# Patient Record
(demographics unavailable — no encounter records)

---

## 2025-03-05 NOTE — DVHPNRES
Progress Note


Date Seen:  Mar 5, 2025


Resident Creating Document:  LISBET COCHRAN RESIDENT


Medical Necessity Reason


Pt with a Central, PICC or Fol:  No





Subjective


Review of Systems


Patient is a 28-year-old female with past medical history of left lower 

extremity DVT diagnosed in , who came in due to pain in her left lower 

extremity.  According to the patient, for the past 2 weeks she has been 

experiencing left leg pain localized to the medial aspect of her leg which has 

been progressively worsening.  Patient notes the day before yesterday she 

noticed swelling in her ankles along with tenderness to palpation which is what 

prompted this visit to the hospital.  Patient notes she had similar pain in 

2022 when she was diagnosed with DVT, patient was prescribed 

anticoagulation for 3 months thereafter which he reports having taken only for 1

month.  Patient notes at rest she just experiences tingling and numbness, pain 

worsens on walking she rates it as 9/10.  Lower extremity Doppler showed 

occlusive thrombus in great saphenous vein.  Patient notes her last menstrual 

period was in 2025, on further questioning, patient revealed that she is 

aware she was pregnant [facility] 

under[facility]&C.








Past surgical history:  


Home medications:  Denies


Past Hospitalization:  Denies


Social & Personal history:  Patient lives alone.  Denies smoking.  Drinks 1 

alcoholic drink daily, last drink was yesterday.  Denies using drugs.





Allergies:  Denies


 








Patient seen and examined at bedside. Patient is alert and oriented to time, 

place person and responding to all questions.


General: Fatigue, chills


Eyes:  No Pain, No Vision change, No Conjunctivae inflammation, No Eyelid 

inflammation, No Other, No Redness


ENT:  No Ear pain, No Ear discharge, No Nose pain, No Nose discharge, No Nose 

congestion, No Mouth pain, No Mouth swelling, No Throat pain, No Throat 

swelling, No Other


Cardiovascular:  No Chest Pain, No Palpitations, No Orthopnea, No Paroxysmal No 

Dyspnea, No Edema, No Lt Headedness, No Other


Respiratory:  No Cough, No Dry,  Shortness of breath, No SOB with exertion, No 

Wheezing, No Hemoptysis, No Pleuritic Pain, No Sputum, No Other


Gastrointestinal:  No Nausea, No Vomiting, No Abdominal Pain, No Diarrhea, No 

Constipation, No Melena, No Hematochezia, No Other


Genitourinary:  No Dysuria, No Frequency, No Incontinence, No Hematuria, No 

Retention, No Other


Musculoskeletal:  No other, No neck pain, No shoulder pain, No arm pain, No back

pain, No hand pain, No leg pain, No foot pain


Skin:  No Rash, No Lesions, No Jaundice, No Bruising, No Other





Objective


vital signs





                                   Vital Sign








  Date Time  Temp Pulse Resp B/P (MAP) Pulse Ox O2 Delivery O2 Flow Rate FiO2


 


3/5/25 07:35 98.7 78 17 123/81 (95) 97   





 98.7       


 


3/5/25 07:35      Room Air  


 


3/5/25 05:22       0 21








medications





                               Current Medications








 Medications  Dose


 Ordered  Sig/Angie


 Route  Start Time


 Stop Time Status Last Admin


Dose Admin


 


 Sodium Chloride  10 ml  Q8HR


 IV  3/5/25 06:00


    3/5/25 14:00


10 ML


 


 Acetaminophen  650 mg  Q6HP  PRN


 PO  3/5/25 04:30


     





 


 Morphine Sulfate  2 mg  Q4HPRN  PRN


 IV  3/5/25 04:30


     





 


 Enoxaparin Sodium  90 mg  Q12HR


 SC  3/5/25 10:00


    3/5/25 14:08


90 MG








Examination


General Appearance:  Cooperative. Well developed. Well nourished.  NAD


Head Exam:  Normal inspection


Neck Exam:  Normal inspection. Non-tender. Normal alignment


Pulmonary/Respiratory:  Chest non-tender. Clear bilateral breath sounds, no 

crackles, no wheezing.


Cardiovascular/Chest: Regular rate and rhythm.  No murmurs.  No JVD.


Peripheral Pulses:  2+ Radial (R). 2+ Radial (L). 2+ Pedal (R). 2+ Pedal (L)


Abdominal Exam:  Normal bowel sounds. Soft.  normal abdomen, no visible veins, 

Nontender.  No hepatospenomegaly.  No masses


Lower extremities: Negative lower extremity edema.  Tenderness to palpation 

around the left ankle and medial aspect of the left lower extremity, no cord or 

masses palpated


Neuro/Mental Status: A&O x4.  Coherent.


Thoughts/Psych:  Normal thought pattern.  Appropriate mood and affect.  Good 

judgement and insight


Skin Exam:  Normal inspection. Normal color. Warm. Dry


laboratory and microbiology


                                Laboratory Tests


3/5/25 02:27

















Test


 3/5/25


02:27 Range/Units


 


 


Serum Glucose 96    mg/dL








Labs and/or images reviewed:  Labs reviewed by me, Image(s) reviewed by me





Problem List/Assessment/Plan


Problem List/Assessment/Plan


Left lower extremity superficial DVT, 2nd episode


Ruled out pulmonary embolism


- lower extremity Doppler:  Occlusive thrombus in great saphenous vein 


- V/Q scan: Low probability for PE


- stopped therapeutic Lovenox and started on prophylactic Lovenox


- ibuprofen 400 mg q.6 hours as needed for left lower extremity tenderness








Obesity class 2 


- counseled





S/p D&C on 2025


- urine pregnancy test positive 


- beta hCG 2817.4


- monitor





Goals of care:  Full code, discussed for >16 minutes on 2025


Plan discussed with patient


Plan discussed with Dr. Wheeler


Plan discussed with:  Patient, Other (RN)





My Orders


My Orders





                           Orders - LISBET COCHRAN RESIDENT








Procedure Category Date Status





  Time 


 


Nm Vq Scan NM 3/5/25 Resulted





  11:59 

















LISBET COCHRAN              Mar 5, 2025 16:53

## 2025-03-05 NOTE — DVH
NUCLEAR MEDICINE VENTILATION/PERFUSION LUNG SCAN.



INDICATION: PULMONARY EMBOLISM



COMPARISON: None



TECHNIQUE:  Following intravenous demonstration of  4 millicuries of technetium 99m MAA, and inhalati
on of 7 mCi of z 9 133 gas scintigrams were obtained in multiple projections of the lungs.



FINDINGS:



There is normal uptake of radionuclide on both the ventilation and perfusion portions of the examinat
ion. No mismatched perfusion defects are demonstrated. Uptake is normally homogeneous.



IMPRESSION: 



1. Low probability for PE.



 



Electronically Signed by: Antonio Bowman at 03/05/2025 15:37:22 PM

## 2025-03-05 NOTE — DVH
Left lower extremity venous duplex



Clinical History: LLE SWELLING



Comparison: LT LOWER DVT on DOS: 10/1/22, LLDVT on DOS: 10/1/22, LLDVT on DOS: 3/31/22



Technique: 



Duplex Doppler evaluation of the deep venous system of the left lower extremity from the common femor
al vein to the popliteal vein including color Doppler and spectral/pulsed waveform analysis was perfo
rmed.



Findings: 



The common femoral vein demonstrates appropriate compressibility and waveform variability.



Occlusive thrombus noted in the great saphenous vein.



The femoral vein demonstrates appropriate compressibility and waveform variability.



The deep femoral vein demonstrates appropriate compressibility and waveform variability.



The popliteal vein demonstrates appropriate compressibility and waveform variability.



There is normal compressibility at the tibioperoneal trunk.



Impression: 



Occlusive thrombus noted in  great saphenous vein.



Electronically Signed by: Ashok Mohan at 03/05/2025 00:22:35 AM

## 2025-03-05 NOTE — DVHHPRES
History of Present Illness


Resident Creating Document:  RONNA LANDRY RESIDENT


History of Present Illness


JET HERRERA is a 28-year-old female with history of DVT in  

presented to the ED with the chief complaints of worsening of left lower 

extremity pain.  Patient reported the pain in the left lower limb started 2 

weeks ago and then since last 2 days patient has been observing swelling and 

redness in the LLL which brought her to visit ED. on my assessment patient 

denies fever, chest pain, shortness of breath, recent long travel, recent 

surgery, cancer, recent immobilization, other acute associated symptoms.  

Patient reported she does have DVT in  after  and she reported she 

under treated for that and she is not taking any blood thinners at this point.





PMH: History of DVT in  


PSH:  


Family history:  No history of cancer, DVTs, blood disorders


Personal history:  Lives with family.  Denies smoking, alcohol and other drug ab

use 


Allergies:  No known allergies 


Home medications:  None





Review of Systems


Review of Systems


Patient seen and examined at the bedside.  Patient reported pain, swelling, 

redness in left lower extremity


Constitutional:  No: Fever, Chills, Sweats, Weakness, Malaise, Other


Eyes:  No: Pain, Vision change, Conjunctivae inflammation, Eyelid inflammation, 

Other, Redness


ENT:  No: Ear pain, Ear discharge, Nose pain, Nose discharge, Nose congestion, 

Mouth pain, Mouth swelling, Throat pain, Throat swelling, Other


Respiratory:  No: Cough, Dry, Shortness of breath, SOB with excertion, Wheezing,

Hemoptysis, Pleuritic Pain, Sputum, Wheezing, Other


Cardiovascular:  No: Chest Pain, Palpitations, Orthopnea, Paroxysmal Noc. 

Dyspnea, Edema, Lt Headedness, Other


Gastrointestinal:  No: Nausea, Vomiting, Abdominal Pain, Diarrhea, Constipation,

Melena, Hematochezia, Other


Genitourinary:  No Dysuria, No Frequency, No Incontinence, No Hematuria, No 

Retention, No Other


Musculoskeletal:  leg pain


Skin:  No: Rash, Lesions, Jaundice, Bruising, Other


Neurological:  No: Weakness, Numbness, Incoordination, Change in speech, 

Confusion, Seizures, Other


Allergies:  


Coded Allergies:  


     NO KNOWN ALLERGIES (Unverified , 11/10/17)


Medications





                               Current Medications








 Medications  Dose


 Ordered  Sig/Angie


 Route  Start Time


 Stop Time Status Last Admin


Dose Admin


 


 Sodium Chloride  10 ml  Q8HR


 IV  3/5/25 06:00


    3/5/25 05:19


10 ML


 


 Ondansetron HCl  4 mg  Q4HP  PRN


 IV  3/5/25 04:30


     





 


 Acetaminophen  650 mg  Q6HP  PRN


 PO  3/5/25 04:30


     





 


 Morphine Sulfate  2 mg  Q4HPRN  PRN


 IV  3/5/25 04:30


     





 


 Nitroglycerin  0.4 mg  Q5MINP  PRN


 SL  3/5/25 04:30


     





 


 Morphine Sulfate  2 mg  Q30M  PRN


 IV  3/5/25 04:30


     





 


 Enoxaparin Sodium  90 mg  Q12HR


 SC  3/5/25 10:00


     














Exam


Vital Signs





Vital Signs








  Date Time  Temp Pulse Resp B/P (MAP) Pulse Ox O2 Delivery O2 Flow Rate FiO2


 


3/5/25 05:22  61 18  99 Room Air* 0 21


 


3/5/25 05:22 98.3   130/78 (95)    





 98.3       








Exam


Pt is lying on bed





General Appearance:  Alert, Oriented X3, Cooperative, Not in acute distress


HEENT:  Atraumatic, Mucous membranes moist/pink


Respiratory:  Clear to auscultation, Normal air movement, No added sounds


Cardiovascular:  Regular rate, Normal S1, Normal S2, No murmurs


Abdominal:   Active bowel sounds, Soft, no distention, no tenderness


Extremities:  Swelling, redness in LLE along with tenderness


Skin:  No   Significant rash, except past surgical scars


Neuro:  Normal speech,  sensorimotor deficits none


Psych/Mental Status:  Mental status NL, Mood NL


Nurse was there as sharperone during examination





Labs/Xrays





                                      Labs








Test


 3/5/25


05:12 3/5/25


05:00 3/5/25


02:27 Range/Units


 


 


White Blood Count


 


 


 5.4 


 4.4-10.8


10^3/uL


 


Red Blood Count


 


 


 4.07 


 4.0-5.20


10^6/uL


 


Hemoglobin   11.5 L 12.2-16.2  g/dL


 


Hematocrit   34.8 L 36.0-46.0  %


 


Mean Corpuscular Volume   85.4  80.0-100.0  fL


 


Mean Corpuscular Hemoglobin   28.2  28.0-32.0  pg


 


Mean Corpuscular Hemoglobin


Concent 


 


 33.0 


 32.0-36.0  g/dL





 


Red Cell Distribution Width   14.0  11.8-14.3  %


 


Platelet Count


 


 


 235 


 140-450


10^3/uL


 


Mean Platelet Volume   8.7  6.9-10.8  fL


 


Neutrophils (%) (Auto)   61.3  37.0-80.0  %


 


Lymphocytes (%) (Auto)   25.0  10.0-50.0  %


 


Monocytes (%) (Auto)   9.3  0.0-12.0  %


 


Eosinophils (%) (Auto)   3.8  0.0-7.0  %


 


Basophils (%) (Auto)   0.6  0.0-2.0  %


 


Neutrophils # (Auto)


 


 


 3.3 


 1.6-8.6  10


^3/uL


 


Lymphocytes # (Auto)


 


 


 1.4 


 0.4-5.4  10


^3/uL


 


Monocytes # (Auto)   0.5  0-1.3  10 ^3/uL


 


Eosinophils # (Auto)   0.2  0-0.8  10 ^3/uL


 


Basophils # (Auto)   0  0-0.2  10 ^3/uL


 


Nucleated Red Blood Cells   0.1   %


 


Sodium Level   142  136-145  mmol/L


 


Potassium Level   3.9  3.5-5.1  mmol/L


 


Chloride Level   107    mmol/L


 


Carbon Dioxide Level   26  20-31  mmol/L


 


Anion Gap   9  5-15  


 


Blood Urea Nitrogen   9  9-23  mg/dL


 


Creatinine


 


 


 0.67 


 0.550-1.02


mg/dL


 


Glomerular Filtration Rate


Calc 


 


 122 


 >90  mL/min





 


BUN/Creatinine Ratio   13.4  10.0-20.0  


 


Serum Glucose   96    mg/dL


 


Hemoglobin A1c   4.8  <5.7  % A1C


 


Calcium Level   9.8  8.7-10.4  mg/dL


 


Total Bilirubin   0.3  0.2-1.0  mg/dL


 


Aspartate Amino Transferase


(AST) 


 


 14 


 13-40  U/L





 


Alanine Aminotransferase (ALT)   15  7-40  U/L


 


Alkaline Phosphatase   56    U/L


 


Total Protein   7.3  5.7-8.2  g/dL


 


Albumin   4.2  3.2-4.8  g/dL


 


Thyroid Stimulating Hormone


(TSH) 


 


 0.71 


 0.55-4.78


uIU/mL











Assessment/Plan


Assessment/Plan


# Left leg DVT in great saphenous vein


-evident on venous scan


-currently giving enoxaparin 1 mg/kg


-ordered anticoagulant panel





# obesity grade 2 with a BMI 35.1


-nutritional counseling





No GI be PPX 


Therapeutic enoxaparin


Cardiac diet





Goals of care discussed with the patient for more than 29 minutes:  Full code 

status 


Case discussed with Dr. Tyson, patient and nurse


Plan discussed with:  Patient


My Orders





                          Orders - RONNA LANDRY RESIDENT








Procedure Category Date Status





  Time 


 


Admit ADMIT 3/5/25 Transmitted





  04:19 


 


Allergies GILDA 3/5/25 In Process





  04:19 


 


Code Status CODE 3/5/25 Transmitted





  04:19 


 


Sodium Chloride Lock PHA 3/5/25 In Process





 (Saline Lock Ns)  06:00 


 


Ondansetron Hcl PHA 3/5/25 In Process





 (Zofran)  04:30 


 


Cardiac DIET 3/5/25 Transmitted





Diet-2gna,Lofat,Lochol  Breakfast 


 


Condition: Stable GILDA 3/5/25 In Process





  04:19 


 


Acetaminophen Tablet PHA 3/5/25 In Process





 (Tylenol Tablet)  04:30 


 


Morphine Sulfate PHA 3/5/25 In Process





Injection  04:30 


 


Nitroglycerin PHA 3/5/25 In Process





Sublingual (Ntrostat  04:30 


 


Morphine Sulfate PHA 3/5/25 In Process





Injection  04:30 


 


Oxygen By Nasal RT 3/5/25 Transmitted





Cannula  04:19 


 


Stat Ekg For Chest GILDA 3/5/25 In Process





Pain  04:19 


 


Notify Md Of Changes GILDA 3/5/25 In Process





From Base  04:19 


 


Enoxaparin Sodium PHA 3/5/25 In Process





 (Lovenox)  10:00 


 


B-Type Natriuretic LAB 3/5/25 In Process





Peptide  04:57 


 


Blood Alcohol LAB 3/5/25 In Process





  04:57 


 


Covid19 Antigen Zohra LAB 3/5/25 Logged





   


 


Drug Screen LAB 3/5/25 Logged





  04:57 


 


Lactic Acid W/ Reflex LAB 3/5/25 Logged





Order  04:57 


 


Magnesium LAB 3/5/25 In Process





  04:57 


 


PTPTT LAB 3/5/25 In Process





  04:57 


 


Rapid Influenza A&B LAB 3/5/25 Logged





  04:57 


 


Urinalysis LAB 3/5/25 Logged





  04:57 


 


Antithrombin Iii LAB 3/5/25 In Process





Antigen  04:57 


 


Martina; Direct LAB 3/5/25 In Process





  04:57 











Date of Service:  Mar 4, 2025


Billing Provider:  FAHAD TYSON MD


Common Visit Codes:  99223-INITIAL  INP/OBS CARE (HIGH)











GRIS,RONNA WOOTEN             Mar 5, 2025 05:41


FAHAD TYSON MD             Mar 5, 2025 18:36

## 2025-03-05 NOTE — ED.PDOC
Musculoskeletal


HPI Comments


20-YEAR-OLD FEMALE COMPLAINING OF LEFT LOWER EXTREMITY PAIN.  PATIENT STATES IT 

STARTED TWO WEEKS AGO AND THEN SHE WAS STARTED NOTICING SWELLING AND REDNESS ON 

THE FRONT OF HER LOWER LEG TODAY.  PATIENT DOES REPORT A HISTORY OF DVT THREE 

YEARS AGO.  AFTER GIVING BIRTH.  STATES SHE WAS NOT CURRENTLY ON ANY BLOOD 

THINNERS.


Chief Complaint:  Lower Extremity


Time Seen by MD:  23:45


Primary Care Provider:  none


Reviewed Notes:  Nurses Notes


Allergies:  


Coded Allergies:  


     NO KNOWN ALLERGIES (Unverified , 11/10/17)


Home Meds


Active Scripts


Apixaban Base (ELIQUIS) 2.5 Mg Tab, 2.5 MG PO BID for 5 Days, #10 TAB


   Prov:CHINMAY GOMES MD         10/2/22


Reported Medications


Prenatal Vit W/ Ferrous Fumara (PRENATAL) 1 Tab Tab, 1 TAB OR, TAB


   10/18/17


Information Source:  Patient


Mode of Arrival:  Ambulatory


Location:  Left





Past Medical History


PAST MEDICAL HISTORY:  Anxiety


Surgical History:  


GYN History:  No Pertinent GYN History





Family History


Family History:  Family hx of DM, Family hx of HTN





Social History


Smoker:  Non-Smoker


Alcohol:  Occasionally


Drugs:  Marijuana


Lives In:  Home





Constitutional:  denies: chills, diaphoresis, fatigue, fever, malaise, sweats, 

weakness, others


EENTM:  denies: blurred vision, double vision, ear bleeding, ear discharge, ear 

drainage, ear pain, ear ringing, eye pain, eye redness, hearing loss, mouth 

pain, mouth swelling, nasal discharge, nose bleeding, nose congestion, nose 

pain, photophobia, tearing, throat pain, throat swelling, voice changes, others


Respiratory:  denies: cough, hemoptysis, orthopnea, SOB at rest, shortness of 

breath, SOB with excertion, stridor, wheezing, others


Cardiovascular:  denies: chest pain, dizzy spells, diaphoresis, Dyspnea on 

exertion, edema, irregular heart beat, left arm pain, lightheadedness, 

palpitations, PND, syncope, others


Gastrointestinal:  denies: abdomen distended, abdominal pain, blood streaked 

bowels, constipated, diarrhea, dysphagia, difficulty swallowing, hematemesis, 

melena, nausea, poor appetite, poor fluid intake, rectal bleeding, rectal pain, 

vomiting, others


Genitourinary:  denies: abnormal vagina bleeding, burning, dyspareunia, dysuria,

flank pain, frequency, hematuria, incontinence, pain, pregnant, vagina 

discharge, urgency, others


Neurological:  denies: dizziness, fainting, headache, left sided numbness, left 

sided weakness, numbness, paresthesia, pre-existing deficit, right sided 

numbness, right sided weakness, seizure, speech problems, tingling, tremors, 

weakness, others


Musculoskeletal:  reports: joint swelling, muscle pain


Integumetry:  denies: bruises, change in color, change in hair/nails, dryness, 

laceration, lesions, lumps, rash, wounds, others


Allergic/Immunocompromised:  denies: Difficulty Healing, Frequent Infections, 

Hives, Itching, others





Physical Exam


General Appearance:  No Apparent Distress, Normal


HEENT:  Normal ENT Inspection, Pharynx Normal, TMs Normal


Neck:  Full Range of Motion, Non-Tender, Normal, Normal Inspection


Respiratory:  Chest Non-Tender, Lungs Clear, No Accessory Muscle Use, No 

Respiratory Distress, Normal Breath Sounds


Cardiovascular:  No Edema, No JVD, No Murmur, No Gallop, Normal Peripheral 

Pulses, Regular Rate/Rhythm


Breast Exam:  Deferred


Gastrointestinal:  No Organomegaly, Non Tender, No Pulsatile Mass, Normal Bowel 

Sounds, Soft


Genitalia:  Deferred


Pelvic:  Deferred


Rectal:  Deferred


Extremities:  No calf tenderness, Normal capillary refill, Normal inspection, 

Normal range of motion, Non-tender, No pedal edema


Musculoskeletal :  


   Location:  Left


   Extremity Location:  Leg (TENDER TO PALPATION OVER THE ANTERIOR LEG)


   Apperance:  Normal


Neurologic:  Alert, CNs II-XII nml as Tested, No Motor Deficits, Normal Affect, 

Normal Mood, No Sensory Deficits


Cerebellar Function:  Normal


Reflexes:  Normal


Skin:  Dry, Normal Color, Warm


Lymphatic:  No Adenopathy





Was a procedure done?


Was a procedure done?:  No





Differential Diagnosis EXT


Differential Diagnosis:  Cellulitis, Deep Vein Thrombosis, Compartment Syndrome,

Sprain, Dislocation





X-Ray, Labs, Meds, VS





                                   Vital Signs








  Date Time  Temp Pulse Resp B/P (MAP) Pulse Ox O2 Delivery O2 Flow Rate FiO2


 


3/4/25 23:50 97.9 67 17 143/84 (103) 99   








X-Ray, Labs, Meds, VS Comment


ULTRASOUND OF LEFT LOWER EXTREMITY: 


Impression: 





Occlusive thrombus noted in  great saphenous vein.





Electronically Signed by: Ashok Mohan at 2025 00:22:35 AM





HE WAS DISCUSSED WITH THE PATIENT THE POSSIBILITY OF ADMISSION IN ORDER FOR 

POSSIBLE IR CONSULT.  PATIENT STATES SHE WOULD PREFER TO TRY CALLING PRIMARY 

CARE DOCTOR AND SITTING A FOLLOW UP APPOINTMENT 


ADVISED PATIENT SHE WILL NEED TO STARTED TAKING LOW-DOSE ASPIRIN DAILY ALONG 

WITH APPLYING COMPRESSION STOCKINGS AND HEAT BAD.


Time of 1ST Reevaluation:  01:35


Reevaluation 1ST:  Unchanged


Patient Education/Counseling:  Diagnosis, Need For Follow Up (PATIENT ADVISED TO

FOLLOW-UP IN THE EMERGENCY ROOM IN THE NEXT 24 TO 48 HOURS IF SYMPTOMS DO NOT 

IMPROVE.  ADVISED FOLLOW-UP WITH PCP IN THE NEXT 3 TO 5 DAYS.  PATIENT 

VERBALIZED UNDERSTANDING. )


Family Education/Counseling:  Diagnosis, Treatment





Departure 1


Departure


Time of Disposition:  01:35


Impression:  


   Primary Impression:  


   Superficial vein thrombosis


Disposition:   HOME / SELF CARE / HOMELESS


Condition:  Fair


Discharged With:  Self





Critical Care Note


Critical Care Time?:  No





Stability


Stability form required:  No





Heart Score


Heart Score:  








Heart Score Response (Comments) Value


 


History N/A 0


 


EKG N/A 0


 


Age N/A 0


 


Risk Factors N/A 0


 


Troponin N/A 0


 


Total  0

















DENISHA MARK          Mar 5, 2025 01:38

## 2025-03-06 NOTE — DVHPNRES
Progress Note


Date Seen:  Mar 6, 2025


Resident Creating Document:  LISBET COCHRAN RESIDENT


Medical Necessity Reason


Pt with a Central, PICC or Fol:  No





Subjective


Review of Systems


Patient is a 28-year-old female with past medical history of left lower 

extremity DVT diagnosed in , who came in due to pain in her left lower 

extremity.  According to the patient, for the past 2 weeks she has been 

experiencing left leg pain localized to the medial aspect of her leg which has 

been progressively worsening.  Patient notes the day before yesterday she 

noticed swelling in her ankles along with tenderness to palpation which is what 

prompted this visit to the hospital.  Patient notes she had similar pain in 

2022 when she was diagnosed with DVT, patient was prescribed 

anticoagulation for 3 months thereafter which he reports having taken only for 1

month.  Patient notes at rest she just experiences tingling and numbness, pain 

worsens on walking she rates it as 9/10.  Lower extremity Doppler showed 

occlusive thrombus in great saphenous vein.  Patient notes her last menstrual 

period was in 2025, on further questioning, patient revealed that she is 

aware she was pregnant and [facility].








Past surgical history:  


Home medications:  Denies


Past Hospitalization:  Denies


Social & Personal history:  Patient lives alone.  Denies smoking.  Drinks 1 

alcoholic drink daily, last drink was yesterday.  Denies using drugs.





Allergies:  Denies


 








Patient seen and examined at bedside. Patient is alert and oriented to time, 

place person and responding to all questions.  Patient notes continued left 

lower extremity pain and tenderness to palpation, however, improved from 

yesterday at 6/10.





Objective


vital signs





                                   Vital Sign








  Date Time  Temp Pulse Resp B/P (MAP) Pulse Ox O2 Delivery O2 Flow Rate FiO2


 


3/6/25 10:22 98.4       


 


3/6/25 09:00  60 17 108/69 (82) 99   


 


3/6/25 08:00      Room Air* 0 99





        21














                           Total Intake and Output   


 


 3/5/25 3/5/25 3/6/25





 15:00 23:00 07:00


 


Intake Total   900 ml


 


Balance   900 ml








medications





                               Current Medications








 Medications  Dose


 Ordered  Sig/Angie


 Route  Start Time


 Stop Time Status Last Admin


Dose Admin


 


 Sodium Chloride  10 ml  Q8HR


 IV  3/5/25 06:00


    3/6/25 05:51


10 ML


 


 Acetaminophen  650 mg  Q6HP  PRN


 PO  3/5/25 04:30


    3/6/25 10:22


650 MG


 


 Enoxaparin Sodium  40 mg  DAILY


 SC  3/6/25 10:00


    3/6/25 08:58


40 MG


 


 Ibuprofen  400 mg  Q6HP  PRN


 PO  3/5/25 19:15


    3/6/25 09:56


400 MG








Examination


General Appearance:  Cooperative. Well developed. Well nourished.  NAD


Head Exam:  Normal inspection


Neck Exam:  Normal inspection. Non-tender. Normal alignment


Pulmonary/Respiratory:  Chest non-tender. Clear bilateral breath sounds, no 

crackles, no wheezing.


Cardiovascular/Chest: Regular rate and rhythm.  No murmurs.  No JVD.


Peripheral Pulses:  2+ Radial (R). 2+ Radial (L). 2+ Pedal (R). 2+ Pedal (L)


Abdominal Exam:  Normal bowel sounds. Soft.  normal abdomen, no visible veins, 

Nontender.  No hepatospenomegaly.  No masses


Lower extremities: Negative lower extremity edema.  Tenderness to palpation 

around the left ankle and medial aspect of the left lower extremity, no cord or 

masses palpated


Neuro/Mental Status: A&O x4.  Coherent.


Thoughts/Psych:  Normal thought pattern.  Appropriate mood and affect.  Good 

judgement and insight


Skin Exam:  Normal inspection. Normal color. Warm. Dry


laboratory and microbiology


                                Laboratory Tests


3/6/25 06:05

















Test


 3/6/25


06:05 Range/Units


 


 


Serum Glucose 98    mg/dL








Labs and/or images reviewed:  Labs reviewed by me, Image(s) reviewed by me





Problem List/Assessment/Plan


Problem List/Assessment/Plan


Left lower extremity superficial DVT, 2nd episode; probable 


Hypercoagulable state secondary to pregnancy/recent D&C


Ruled out pulmonary embolism


- lower extremity Doppler:  Occlusive thrombus in great saphenous vein 


- V/Q scan: Low probability for PE


- stopped therapeutic Lovenox and started on prophylactic Lovenox


- ibuprofen 400 mg q.6 hours as needed for left lower extremity tenderness





Obesity class 2 


- counseled





S/p D&C on 2025


- urine pregnancy test positive 


- beta hCG 2817.4


- we will continue to trend beta hCG


- monitor





Goals of care:  Full code, discussed for >16 minutes on 2025


Plan discussed with patient


Plan discussed with Dr. Wheeler


Plan discussed with:  Patient, Spouse, Other (RN)





My Orders


My Orders





                           Orders - LISBET COCHRAN








Procedure Category Date Status





  Time 


 


Regular Diet DIET 3/5/25 Transmitted





  Dinner 


 


Enoxaparin Sodium PHA 3/6/25 In Process





 (Lovenox)  10:00 


 


Ibuprofen Tablet PHA 3/5/25 In Process





 (Motrin Tablet)  19:15 

















LISBET COCHRAN RESIDENT              Mar 6, 2025 12:25

## 2025-03-06 NOTE — DVHDSRES
Discharge Summary


Date of Admission


Resident Creating Document:  LISBET COCHRAN RESIDENT


Mar 5, 2025 at 04:19





Date of Discharge:


Mar 6, 2025





Admitting Diagnosis


left leg pain





Labs/Diagnostic Data:





                               Laboratory Results








Test


 3/5/25


13:08 3/5/25


06:55 3/5/25


05:12 3/5/25


04:57


 


Urine Color


 Yellow


(Yellow) 


 


 





 


Urine Clarity Clear (Clear)    


 


Urine pH 6.0 (5.0-9.0)    


 


Urine Specific Gravity


 1.033


(1.001-1.035) 


 


 





 


Urine Protein 1+ (Negative)    


 


Urine Ketones


 Negative


(Negative) 


 


 





 


Urine Blood


 2+ /uL


(Negative) 


 


 





 


Urine Nitrite


 Negative


(Negative) 


 


 





 


Urine Bilirubin


 Negative


(Negative) 


 


 





 


Urine Urobilinogen


 2 mg/dL


(Negative) 


 


 





 


Urine Leukocyte Esterase


 Trace /uL


(Negative) 


 


 





 


Urine RBC 2 /hpf (0 - 4)    


 


Urine Microscopic WBC 6 /HPF (0-5)    


 


Urine Squamous Epithelial


Cells Few /hpf (<5) 


 


 


 





 


Urine Bacteria


 Few /hpf (None


Seen) 


 


 





 


Urine Mucus


 Few (None


Seen) 


 


 





 


Urine Glucose


 Normal mg/dL


(Normal) 


 


 





 


Urine Pregnancy Test


 Positive


(Negative) 


 


 





 


Urine Opiates Screen Neg (NEGATIVE)    


 


Urine Fentanyl Screen Pos (NEGATIVE)    


 


Urine Barbiturates Screen Neg (NEGATIVE)    


 


Urine Phencyclidine Screen Neg (NEGATIVE)    


 


Urine Amphetamines Screen Neg (NEGATIVE)    


 


Urine Benzodiazepines Screen Neg (NEGATIVE)    


 


Urine Cocaine Screen Neg (NEGATIVE)    


 


Urine Cannabinoids Screen Neg (NEGATIVE)    


 


Lactic Acid Level


 


 0.8 mmol/L


(0.4-2.0) 


 





 


Influenza Type A Antigen


 


 


 


 Negative


(Negative)


 


Influenza Type B Antigen


 


 


 


 Negative


(Negative)


 


Test


 3/5/25


02:27 3/5/25


00:00 


 





 


White Blood Count


 5.4 10^3/uL


(4.4-10.8) 


 


 





 


Red Blood Count


 4.07 10^6/uL


(4.0-5.20) 


 


 





 


Hemoglobin


 11.5 g/dL


(12.2-16.2) 


 


 





 


Hematocrit


 34.8 %


(36.0-46.0) 


 


 





 


Mean Corpuscular Volume


 85.4 fL


(80.0-100.0) 


 


 





 


Mean Corpuscular Hemoglobin


 28.2 pg


(28.0-32.0) 


 


 





 


Mean Corpuscular Hemoglobin


Concent 33.0 g/dL


(32.0-36.0) 


 


 





 


Red Cell Distribution Width


 14.0 %


(11.8-14.3) 


 


 





 


Platelet Count


 235 10^3/uL


(140-450) 


 


 





 


Mean Platelet Volume


 8.7 fL


(6.9-10.8) 


 


 





 


Neutrophils (%) (Auto)


 61.3 %


(37.0-80.0) 


 


 





 


Lymphocytes (%) (Auto)


 25.0 %


(10.0-50.0) 


 


 





 


Monocytes (%) (Auto)


 9.3 %


(0.0-12.0) 


 


 





 


Eosinophils (%) (Auto)


 3.8 %


(0.0-7.0) 


 


 





 


Basophils (%) (Auto)


 0.6 %


(0.0-2.0) 


 


 





 


Neutrophils # (Auto)


 3.3 10 ^3/uL


(1.6-8.6) 


 


 





 


Lymphocytes # (Auto)


 1.4 10 ^3/uL


(0.4-5.4) 


 


 





 


Monocytes # (Auto)


 0.5 10 ^3/uL


(0-1.3) 


 


 





 


Eosinophils # (Auto)


 0.2 10 ^3/uL


(0-0.8) 


 


 





 


Basophils # (Auto)


 0 10 ^3/uL


(0-0.2) 


 


 





 


Nucleated Red Blood Cells 0.1 %    


 


Prothrombin Time


 10.0 sec


(9.3-11.8) 


 


 





 


Prothrombin Time INR


 0.94


(0.9-1.15) 


 


 





 


Activated Partial


Thromboplast Time 25.0 SEC


(24.5-34.5) 


 


 





 


Sodium Level


 142 mmol/L


(136-145) 


 


 





 


Potassium Level


 3.9 mmol/L


(3.5-5.1) 


 


 





 


Chloride Level


 107 mmol/L


() 


 


 





 


Carbon Dioxide Level


 26 mmol/L


(20-31) 


 


 





 


Anion Gap 9 (5-15)    


 


Blood Urea Nitrogen 9 mg/dL (9-23)    


 


Creatinine


 0.67 mg/dL


(0.550-1.02) 


 


 





 


Glomerular Filtration Rate


Calc 122 mL/min


(>90) 


 


 





 


BUN/Creatinine Ratio


 13.4


(10.0-20.0) 


 


 





 


Serum Glucose


 96 mg/dL


() 


 


 





 


Hemoglobin A1c


 4.8 % A1C


(<5.7) 


 


 





 


Calcium Level


 9.8 mg/dL


(8.7-10.4) 


 


 





 


Magnesium Level


 1.9 mg/dL


(1.6-2.6) 


 


 





 


Total Bilirubin


 0.3 mg/dL


(0.2-1.0) 


 


 





 


Aspartate Amino Transferase


(AST) 14 U/L (13-40) 


 


 


 





 


Alanine Aminotransferase (ALT) 15 U/L (7-40)    


 


Alkaline Phosphatase


 56 U/L


() 


 


 





 


B-Type Natriuretic Peptide


 32.32 pg/mL


(0-100) 


 


 





 


Total Protein


 7.3 g/dL


(5.7-8.2) 


 


 





 


Albumin


 4.2 g/dL


(3.2-4.8) 


 


 





 


Thyroid Stimulating Hormone


(TSH) 0.71 uIU/mL


(0.55-4.78) 


 


 





 


Beta HCG, Quantitative


 2817.4 mIU/mL


(1.5-4.2) 


 


 





 


Plasma/Serum Blood Alcohol


 < 3.0 mg/dL


(<10) 


 


 





 


SARS-CoV-2 Antigen (Rapid)


 


 Negative


(NEGATIVE) 


 








                             Other Laboratory Tests


3/5/25 02:27











Brief Hx & Hospital Course:


Patient is a 28-year-old female with past medical history of left lower 

extremity DVT diagnosed in 2022, who came in due to pain in her left lower 

extremity.  According to the patient, for the past 2 weeks she has been 

experiencing left leg pain localized to the medial aspect of her leg which has 

been progressively worsening.  Patient notes the day before yesterday she 

noticed swelling in her ankles along with tenderness to palpation which is what 

prompted this visit to the hospital.  Patient notes she had similar pain in 

September of 2022 when she was diagnosed with DVT, patient was prescribed 

anticoagulation for 3 months thereafter which he reports having taken only for 1

month.  Patient notes at rest she just experiences tingling and numbness, pain 

worsens on walking she rates it as 9/10.  Lower extremity Doppler showed 

occlusive thrombus in great saphenous vein.  Patient notes her last menstrual 

period was in 03/01/2025, on further questioning, patient revealed that she is 

aware she was pregnant and [facility] 

underwent D&C.





Hospital course:


Discharge Statement:


"Patient was advised to return to the ER or call 911 if any headaches, 

dizziness, shortness of breath, chest pain, abdominal pain, bleeding, fevers, or

worsening of medical condition.





Patient was counseled about treatment plan, medications, possible side effects, 

patientverbalized understanding. All questions were answered to the best of my 

ability.





This discharge took greater then 30 minutes in planning, reviewing 

documentation, counseling the patient, and discussing with other team members."





ASSESSMENT














LISBET COCHRAN RESIDENT              Mar 6, 2025 06:23

## 2025-03-07 NOTE — DVHDSRES
Discharge Summary


Date of Admission


Resident Creating Document:  LISBET COCHRAN RESIDENT


Mar 5, 2025 at 04:19





Date of Discharge:


Mar 6, 2025





Admitting Diagnosis


Left lower extremity pain





Labs/Diagnostic Data:





                               Laboratory Results








Test


 3/7/25


05:59 3/5/25


13:08 3/5/25


06:55 3/5/25


05:12


 


White Blood Count


 3.4 10^3/uL


(4.4-10.8) 


 


 





 


Red Blood Count


 3.75 10^6/uL


(4.0-5.20) 


 


 





 


Hemoglobin


 10.7 g/dL


(12.2-16.2) 


 


 





 


Hematocrit


 32.2 %


(36.0-46.0) 


 


 





 


Mean Corpuscular Volume


 86.0 fL


(80.0-100.0) 


 


 





 


Mean Corpuscular Hemoglobin


 28.5 pg


(28.0-32.0) 


 


 





 


Mean Corpuscular Hemoglobin


Concent 33.2 g/dL


(32.0-36.0) 


 


 





 


Red Cell Distribution Width


 14.2 %


(11.8-14.3) 


 


 





 


Platelet Count


 230 10^3/uL


(140-450) 


 


 





 


Mean Platelet Volume


 8.6 fL


(6.9-10.8) 


 


 





 


Neutrophils (%) (Auto)


 60.3 %


(37.0-80.0) 


 


 





 


Lymphocytes (%) (Auto)


 25.0 %


(10.0-50.0) 


 


 





 


Monocytes (%) (Auto)


 9.3 %


(0.0-12.0) 


 


 





 


Eosinophils (%) (Auto)


 4.9 %


(0.0-7.0) 


 


 





 


Basophils (%) (Auto)


 0.5 %


(0.0-2.0) 


 


 





 


Neutrophils # (Auto)


 2.1 10 ^3/uL


(1.6-8.6) 


 


 





 


Lymphocytes # (Auto)


 0.9 10 ^3/uL


(0.4-5.4) 


 


 





 


Monocytes # (Auto)


 0.3 10 ^3/uL


(0-1.3) 


 


 





 


Eosinophils # (Auto)


 0.2 10 ^3/uL


(0-0.8) 


 


 





 


Basophils # (Auto)


 0 10 ^3/uL


(0-0.2) 


 


 





 


Nucleated Red Blood Cells 0.3 %    


 


Sodium Level


 142 mmol/L


(136-145) 


 


 





 


Potassium Level


 3.7 mmol/L


(3.5-5.1) 


 


 





 


Chloride Level


 113 mmol/L


() 


 


 





 


Carbon Dioxide Level


 24 mmol/L


(20-31) 


 


 





 


Anion Gap 5 (5-15)    


 


Blood Urea Nitrogen 9 mg/dL (9-23)    


 


Creatinine


 0.66 mg/dL


(0.550-1.02) 


 


 





 


Glomerular Filtration Rate


Calc 122 mL/min


(>90) 


 


 





 


BUN/Creatinine Ratio


 13.6


(10.0-20.0) 


 


 





 


Serum Glucose


 92 mg/dL


() 


 


 





 


Calcium Level


 8.8 mg/dL


(8.7-10.4) 


 


 





 


Beta HCG, Quantitative


 902.0 mIU/mL


(1.5-4.2) 


 


 





 


Urine Color


 


 Yellow


(Yellow) 


 





 


Urine Clarity  Clear (Clear)   


 


Urine pH  6.0 (5.0-9.0)   


 


Urine Specific Gravity


 


 1.033


(1.001-1.035) 


 





 


Urine Protein  1+ (Negative)   


 


Urine Ketones


 


 Negative


(Negative) 


 





 


Urine Blood


 


 2+ /uL


(Negative) 


 





 


Urine Nitrite


 


 Negative


(Negative) 


 





 


Urine Bilirubin


 


 Negative


(Negative) 


 





 


Urine Urobilinogen


 


 2 mg/dL


(Negative) 


 





 


Urine Leukocyte Esterase


 


 Trace /uL


(Negative) 


 





 


Urine RBC  2 /hpf (0 - 4)   


 


Urine Microscopic WBC  6 /HPF (0-5)   


 


Urine Squamous Epithelial


Cells 


 Few /hpf (<5) 


 


 





 


Urine Bacteria


 


 Few /hpf (None


Seen) 


 





 


Urine Mucus


 


 Few (None


Seen) 


 





 


Urine Glucose


 


 Normal mg/dL


(Normal) 


 





 


Urine Pregnancy Test


 


 Positive


(Negative) 


 





 


Urine Opiates Screen  Neg (NEGATIVE)   


 


Urine Fentanyl Screen  Pos (NEGATIVE)   


 


Urine Barbiturates Screen  Neg (NEGATIVE)   


 


Urine Phencyclidine Screen  Neg (NEGATIVE)   


 


Urine Amphetamines Screen  Neg (NEGATIVE)   


 


Urine Benzodiazepines Screen  Neg (NEGATIVE)   


 


Urine Cocaine Screen  Neg (NEGATIVE)   


 


Urine Cannabinoids Screen  Neg (NEGATIVE)   


 


Lactic Acid Level


 


 


 0.8 mmol/L


(0.4-2.0) 





 


Anti-Nuclear Antibody Screen


 


 


 


 Positive


(Negative)


 


Test


 3/5/25


04:57 3/5/25


02:27 3/5/25


00:00 





 


Influenza Type A Antigen


 Negative


(Negative) 


 


 





 


Influenza Type B Antigen


 Negative


(Negative) 


 


 





 


Prothrombin Time


 


 10.0 sec


(9.3-11.8) 


 





 


Prothrombin Time INR


 


 0.94


(0.9-1.15) 


 





 


Activated Partial


Thromboplast Time 


 25.0 SEC


(24.5-34.5) 


 





 


Hemoglobin A1c


 


 4.8 % A1C


(<5.7) 


 





 


Magnesium Level


 


 1.9 mg/dL


(1.6-2.6) 


 





 


Total Bilirubin


 


 0.3 mg/dL


(0.2-1.0) 


 





 


Aspartate Amino Transferase


(AST) 


 14 U/L (13-40) 


 


 





 


Alanine Aminotransferase (ALT)  15 U/L (7-40)   


 


Alkaline Phosphatase


 


 56 U/L


() 


 





 


B-Type Natriuretic Peptide


 


 32.32 pg/mL


(0-100) 


 





 


Total Protein


 


 7.3 g/dL


(5.7-8.2) 


 





 


Albumin


 


 4.2 g/dL


(3.2-4.8) 


 





 


Thyroid Stimulating Hormone


(TSH) 


 0.71 uIU/mL


(0.55-4.78) 


 





 


Plasma/Serum Blood Alcohol


 


 < 3.0 mg/dL


(<10) 


 





 


SARS-CoV-2 Antigen (Rapid)


 


 


 Negative


(NEGATIVE) 








                             Other Laboratory Tests


3/7/25 05:59











Brief Hx & Hospital Course:


Patient is a 28-year-old female with past medical history of left lower 

extremity DVT diagnosed in 2022, who came in due to pain in her left lower 

extremity.  According to the patient, for the past 2 weeks she has been 

experiencing left leg pain localized to the medial aspect of her leg which has 

been progressively worsening.  Patient notes the day before yesterday she 

noticed swelling in her ankles along with tenderness to palpation which is what 

prompted this visit to the hospital.  Patient notes she had similar pain in 

September of 2022 when she was diagnosed with DVT, patient was prescribed 

anticoagulation for 3 months thereafter which he reports having taken only for 1

month.  Patient notes at rest she just experiences tingling and numbness, pain 

worsens on walking she rates it as 9/10.  Lower extremity Doppler showed 

occlusive thrombus in great saphenous vein.  Patient notes her last menstrual 

period was in 03/01/2025, on further questioning, patient revealed that she is 

aware she was pregnant and [facility] 

underwent D&C.








Hospital course: 


V/Q scan was done to rule out PE which showed a low probability for PE.  Patient

was continued on prophylactic Lovenox along with ibuprofen 400 mg q.6 hours has 

a was IV Toradol.  Patient's urine pregnancy test came back positive, she 

[facility], beta hCG was trended which 

trended down appropriately.  Patient TIEN came back positive, patient was 

explained in great detail and instructed to follow up with the primary care 

doctor in the outpatient clinic for further workup considering patient's sister 

has diagnosed lupus.  On the day of discharge, patient appeared well and had 

stable vital signs, patient noted improved lower extremity pain on the left, 

patient was discharged home with ibuprofen 400 mg as needed every 6 hours for 4 

days.  Her hospital course was uncomplicated.





General Appearance:  Cooperative. Well developed. Well nourished.  NAD


Head Exam:  Normal inspection


Neck Exam:  Normal inspection. Non-tender. Normal alignment


Pulmonary/Respiratory:  Chest non-tender. Clear bilateral breath sounds, no 

crackles, no wheezing.


Cardiovascular/Chest: Regular rate and rhythm.  No murmurs.  No JVD.


Peripheral Pulses:  2+ Radial (R). 2+ Radial (L). 2+ Pedal (R). 2+ Pedal (L)


Abdominal Exam:  Normal bowel sounds. Soft.  normal abdomen, no visible veins, 

Nontender.  No hepatospenomegaly.  No masses


Lower extremities: Negative lower extremity edema.  Mild tenderness to palpation

around the medial aspect of the left lower extremity, no cord or masses palpated


Neuro/Mental Status: A&O x4.  Coherent.


Thoughts/Psych:  Normal thought pattern.  Appropriate mood and affect.  Good 

judgement and insight


Skin Exam:  Normal inspection. Normal color. Warm. Dry





Condition at Discharge:


Good





Final Diagnosis/Problems List





Left lower extremity superficial DVT, 2nd episode; probable 





Hypercoagulable state secondary to pregnancy/recent D&C





Ruled out pulmonary embolism





Obesity class 2 





S/p D&C on 02/28/2025





Discharge Disposition:


Home





Discharge Instruct/Medications


Diet:  Consistent carbohydrate


Activity:  No Restrictions, As Tolerated


Follow Up/Referral:  


pls follow up with OBGYN





pls follow up with pcp for further workup on + TIEN


Medications:  


ibuprofen as needed for 4 days


Discharge Statement:


"Patient was advised to return to the ER or call 911 if any headaches, 

dizziness, shortness of breath, chest pain, abdominal pain, bleeding, fevers, or

worsening of medical condition.





Patient was counseled about treatment plan, medications, possible side effects, 

patientverbalized understanding. All questions were answered to the best of my 

ability.





This discharge took greater then 30 minutes in planning, reviewing 

documentation, counseling the patient, and discussing with other team members."





ASSESSMENT


Left lower extremity superficial DVT, 2nd episode; probable 


Hypercoagulable state secondary to pregnancy/recent D&C


Ruled out pulmonary embolism


Obesity class 2 


S/p D&C on 02/28/2025











LISBET COCHRAN RESIDENT              Mar 7, 2025 11:34

## 2025-03-15 NOTE — DVH
LEFT Upper Extremity Venous Duplex



Clinical History: SWELLING



Comparison: US LT LOWER DVT on DOS: 3/4/25, LT LOWER DVT on DOS: 10/1/22, LLDVT on DOS: 10/1/22, LLDV
T on DOS: 3/31/22



Technique: Duplex Doppler evaluation of the venous system of the LEFT lower neck and upper extremity 
including color Doppler and spectral/pulsed waveform analysis was performed.



Findings: 



The internal jugular vein demonstrates appropriate compressibility and waveform variability.



The subclavian vein is patent on color Doppler evaluation without intraluminal thrombus and demonstra
nikko waveform variability.



The visualized portion of the brachiocephalic vein is patent on color Doppler evaluation without intr
aluminal thrombus and demonstrates waveform variability.



The axillary vein demonstrates appropriate compressibility and waveform variability.



The brachial veins demonstrate appropriate compressibility and patency on Doppler evaluation.



The basilic vein demonstrates appropriate compressibility and patency on Doppler evaluation.



Superficial venous thrombus is seen in the cephalic vein at the antecubital fossa and distal mid uppe
r arm.



Impression: 



1. No DVT identified in the left upper extremity.



2. However, a superficial thrombus is seen in the cephalic vein at the antecubital fossa and distal m
id upper arm.



Electronically Signed by: Harley Parada at 03/15/2025 19:15:38 PM

## 2025-03-15 NOTE — ED.PDOC
Musculoskeletal


HPI Comments


28 year old Morbidly obese female presents to the ED with chief complaint of 

left upper arm pain/DVT. Patient reports that she was seen in [facility] at 3/5/25 for 

a DVT in her left leg along with also having history of DVT in  due to c-

section. Patient relays that she had Lovenox administered during her admission, 

however, she was never sent home without a RX for any blood thinners. Patient 

states that since yesterday, she has been experiencing worsening pain, swelling,

and redness to her left upper arm. Patient notes that she was told to follow up 

with her PCP due to needing a rheumatology referral. Patient denies any 

numbness, weakness, chest pain, or SOB.


Time Seen by MD:  17:03


Primary Care Provider:  none


Reviewed Notes:  Nurses Notes, Medications, Allergies


Allergies:  


Coded Allergies:  


     NO KNOWN ALLERGIES (Unverified , 11/10/17)


Home Meds


Active Scripts


Ibuprofen Micronized (Ibuprofen) 400 Mg Tab, 400 MG PO Q6HP PRN for 4 Days, #16 

TAB


   Prov:LISBET COCHRAN RESIDENT         3/7/25


Information Source:  Patient


Mode of Arrival:  Ambulatory


Location:  Left


Extremity Location:  Arm


Timing:  Days


Prehospital treatment:  None


Severity:  Moderate


Able to Move Extremity:  Yes


Pain:  Moderate


Mechanism:  Spontaneous


Circumstances:  Spontaneous


Onset of Symptoms:  Spontaneous


Symptoms:  Swelling, Pain, Erythema


DVT Risk Factors:  DVT


Last Tetanus:  UTD, Unknown





Past Medical History


PAST MEDICAL HISTORY:  Anxiety


Past Medical History (Other):  


DVT, blood clots


Surgical History:  


GYN History:  No Pertinent GYN History





Family History


Family History:  Reviewed,noncontributory to illness, Family hx of DM, Family hx

of HTN





Social History


Smoker:  Non-Smoker


Alcohol:  Occasionally


Drugs:  Marijuana


Lives In:  Home





Constitutional:  denies: chills, diaphoresis, fatigue, fever, malaise, sweats, 

weakness, others


EENTM:  denies: blurred vision, double vision, ear bleeding, ear discharge, ear 

drainage, ear pain, ear ringing, eye pain, eye redness, hearing loss, mouth 

pain, mouth swelling, nasal discharge, nose bleeding, nose congestion, nose 

pain, photophobia, tearing, throat pain, throat swelling, voice changes, others


Respiratory:  denies: cough, hemoptysis, orthopnea, SOB at rest, shortness of 

breath, SOB with excertion, stridor, wheezing, others


Cardiovascular:  denies: chest pain, dizzy spells, diaphoresis, Dyspnea on 

exertion, edema, irregular heart beat, left arm pain, lightheadedness, palp

itations, PND, syncope, others


Gastrointestinal:  denies: abdomen distended, abdominal pain, blood streaked 

bowels, constipated, diarrhea, dysphagia, difficulty swallowing, hematemesis, 

melena, nausea, poor appetite, poor fluid intake, rectal bleeding, rectal pain, 

vomiting, others


Genitourinary:  denies: abnormal vagina bleeding, burning, dyspareunia, dysuria,

flank pain, frequency, hematuria, incontinence, pain, pregnant, vagina 

discharge, urgency, others


Neurological:  denies: dizziness, fainting, headache, left sided numbness, left 

sided weakness, numbness, paresthesia, pre-existing deficit, right sided 

numbness, right sided weakness, seizure, speech problems, tingling, tremors, 

weakness, others


Musculoskeletal:  reports: others (Left upper arm swelling, pain, and redness.);

denies: back pain, gout, joint pain, joint swelling, muscle pain, muscle 

stiffness, neck pain


Integumetry:  denies: bruises, change in color, change in hair/nails, dryness, 

laceration, lesions, lumps, rash, wounds, others


Allergic/Immunocompromised:  denies: Difficulty Healing, Frequent Infections, 

Hives, Itching, others


Hematologic/Lymphatic:  denies: anemia, blood clots, easy bleeding, easy 

bruising, swollen glands, others


Endocrine:  denies: excessive hunger, excessive sweating, excessive thirst, 

excessive urination, flushing, intolerance to cold, intolerance to heat, 

unexplained weight gain, unexplained weight loss, others


Psychiatric:  denies: anxiety, bipolar disorder, depression, hopeless, panic 

disorder, schizophrenia, sleepless, suicidal, others


All Other Systems:  Reviewed and Negative





Physical Exam


General Appearance:  Moderate Distress (Mild-to-moderate distress due to left 

arm concerns.), Obese


HEENT:  Normal ENT Inspection, Pharynx Normal, TMs Normal


Neck:  Full Range of Motion, Non-Tender, Normal, Normal Inspection


Respiratory:  Chest Non-Tender, Lungs Clear, No Accessory Muscle Use, No 

Respiratory Distress, Normal Breath Sounds


Cardiovascular:  No Edema, No JVD, No Murmur, No Gallop, Normal Peripheral 

Pulses, Regular Rate/Rhythm


Breast Exam:  Deferred


Gastrointestinal:  No Organomegaly, Non Tender, No Pulsatile Mass, Normal Bowel 

Sounds, Soft


Genitalia:  Deferred


Pelvic:  Deferred


Rectal:  Deferred


Extremities:  Other (  Patient displays a lump to the distal aspect of the 

biceps on anterior aspect of her left arm.  Lump is a proximally 7 cm in the 

length with some oblong shaping.  Tender to the touch.  Localized erythema.)


Musculoskeletal :  


   Apperance:  Normal


Neurologic:  Alert, No Motor Deficits, Normal Affect, Normal Mood, No Sensory 

Deficits


Cerebellar Function:  Normal


Reflexes:  Normal


Skin:  Dry, Normal Color, Warm


Lymphatic:  No Adenopathy





Was a procedure done?


Was a procedure done?:  No





Differential Diagnosis EXT


Differential Diagnosis:  Deep Vein Thrombosis





X-Ray, Labs, Meds, VS





                                   Vital Signs








  Date Time  Temp Pulse Resp B/P (MAP) Pulse Ox O2 Delivery O2 Flow Rate FiO2


 


3/15/25 17:08 98.0 74 18 143/80 (101) 98   





 98.0       








X-Ray, Labs, Meds, VS Comment


All studies performed the ED were evaluated by me personally.  Doppler studies 

of the left arm were unremarkable for any DVT.  However, superficial thrombus is

seen in the cephalic vein at the antecubital fossa and distal biceps region.  

Patient will be started on Xarelto tonight and has been advised to use the 

medication as directed.  Patient will need to follow up with the primary care 

provider for continued long-term management as needed.


Time of 1ST Reevaluation:  20:02


Reevaluation 1ST:  Improved


Consultation:  PCP


Patient Education/Counseling:  Diagnosis, Treatment


Family Education/Counseling:  Diagnosis, Treatment, No Family Present





Departure 1


Departure


Time of Disposition:  20:02


Impression:  


   Primary Impression:  


   Superficial venous thrombosis of arm


Disposition:  01 HOME / SELF CARE / HOMELESS


Condition:  Stable





Additional Instructions:  


Advised patient utilize medication as directed and additionally, follow up with 

primary care provider for re-evaluation and long-term management.


e-Prescriptions


Rivaroxaban (Xarelto Tablet) 15 Mg Tb


15 MG PO BID for 21 Days, #42 TAB


   Prov: TOMAS HUSSEIN PAC         3/15/25


Discharged With:  Self, Friend





Critical Care Note


Critical Care Time?:  No





Stability


Stability form required:  No





Heart Score


Heart Score:  








Heart Score Response (Comments) Value


 


History N/A 0


 


EKG N/A 0


 


Age N/A 0


 


Risk Factors N/A 0


 


Troponin N/A 0


 


Total  0














I personally scribed for TOMAS HUSSEIN PAC ([email or web address]) on 3/15/25 at 17:08.  

Electronically submitted by Natanael Willis ([email or web address]).





TOMAS HUSSEIN PAC               Mar 15, 2025 17:08